# Patient Record
Sex: FEMALE | Race: WHITE | URBAN - METROPOLITAN AREA
[De-identification: names, ages, dates, MRNs, and addresses within clinical notes are randomized per-mention and may not be internally consistent; named-entity substitution may affect disease eponyms.]

---

## 2017-01-03 DIAGNOSIS — J30.1 ALLERGIC RHINITIS DUE TO POLLEN, UNSPECIFIED RHINITIS SEASONALITY: ICD-10-CM

## 2017-01-03 RX ORDER — CETIRIZINE HYDROCHLORIDE 10 MG/1
10 TABLET ORAL DAILY
Qty: 30 TABLET | Refills: 5 | Status: SHIPPED | OUTPATIENT
Start: 2017-01-03 | End: 2017-01-10

## 2018-07-01 PROBLEM — Z00.00 WELL ADULT EXAM: Status: ACTIVE | Noted: 2018-07-01

## 2018-07-02 ENCOUNTER — OFFICE VISIT (OUTPATIENT)
Dept: FAMILY MEDICINE CLINIC | Age: 43
End: 2018-07-02

## 2018-07-02 VITALS
WEIGHT: 156.2 LBS | OXYGEN SATURATION: 100 % | SYSTOLIC BLOOD PRESSURE: 105 MMHG | HEIGHT: 65 IN | HEART RATE: 68 BPM | DIASTOLIC BLOOD PRESSURE: 69 MMHG | TEMPERATURE: 98.5 F | BODY MASS INDEX: 26.02 KG/M2 | RESPIRATION RATE: 12 BRPM

## 2018-07-02 DIAGNOSIS — K64.4 ANAL SKIN TAG: ICD-10-CM

## 2018-07-02 DIAGNOSIS — Z00.00 WELL ADULT EXAM: Primary | ICD-10-CM

## 2018-07-02 PROCEDURE — 99396 PREV VISIT EST AGE 40-64: CPT | Performed by: FAMILY MEDICINE

## 2018-07-02 RX ORDER — CETIRIZINE HYDROCHLORIDE 10 MG/1
TABLET ORAL
Qty: 30 TABLET | Refills: 12 | Status: CANCELLED | OUTPATIENT
Start: 2018-07-02

## 2018-07-02 ASSESSMENT — ENCOUNTER SYMPTOMS
ANAL BLEEDING: 0
BLOOD IN STOOL: 0
CHEST TIGHTNESS: 0
SHORTNESS OF BREATH: 0
CONSTIPATION: 0
VOICE CHANGE: 0
ABDOMINAL PAIN: 0
SORE THROAT: 0
DIARRHEA: 0
RHINORRHEA: 1
CHOKING: 0
NAUSEA: 0
TROUBLE SWALLOWING: 0
WHEEZING: 0

## 2018-07-02 ASSESSMENT — PATIENT HEALTH QUESTIONNAIRE - PHQ9
1. LITTLE INTEREST OR PLEASURE IN DOING THINGS: 0
2. FEELING DOWN, DEPRESSED OR HOPELESS: 0
SUM OF ALL RESPONSES TO PHQ9 QUESTIONS 1 & 2: 0
SUM OF ALL RESPONSES TO PHQ QUESTIONS 1-9: 0

## 2018-07-02 NOTE — PROGRESS NOTES
voice change. Mild allergies. Controlled with over-the-counter allergy meds. Eyes: Negative for visual disturbance. Respiratory: Negative for choking, chest tightness, shortness of breath and wheezing. Cardiovascular: Negative for chest pain, palpitations and leg swelling. Gastrointestinal: Negative for abdominal pain, anal bleeding, blood in stool, constipation, diarrhea and nausea. No GI sxs since had ileitis. Feels occ lump at the rectum that she can push back in. Not sore and no bleeding. She has no constipation or diarrhea. Genitourinary: Negative for dysuria, flank pain, frequency, hematuria, pelvic pain, vaginal bleeding and vaginal discharge. Musculoskeletal: Negative for arthralgias and myalgias. Skin: Negative for rash. Neurological: Negative for weakness, light-headedness and headaches. Hematological: Negative for adenopathy. Does not bruise/bleed easily. Psychiatric/Behavioral: Negative for dysphoric mood and sleep disturbance. The patient is not nervous/anxious.       Lab Results   Component Value Date     08/01/2016    K 4.6 08/01/2016     08/01/2016    CO2 24 08/01/2016    BUN 11 08/01/2016    CREATININE 0.9 08/01/2016    GLUCOSE 93 08/01/2016    CALCIUM 9.8 08/01/2016    PROT 7.4 08/01/2016    LABALBU 4.2 08/01/2016    BILITOT <0.2 08/01/2016    ALKPHOS 61 08/01/2016    AST 14 (L) 08/01/2016    ALT 11 08/01/2016    LABGLOM >60 08/01/2016    GFRAA >60 08/01/2016    AGRATIO 1.3 08/01/2016    GLOB 3.2 08/01/2016      p   Lab Results   Component Value Date    CHOL 212 (H) 06/20/2016    CHOL 208 (H) 05/06/2014    CHOL 191 01/25/2011     Lab Results   Component Value Date    TRIG 124 06/20/2016    TRIG 128 05/06/2014    TRIG 106 01/25/2011     Lab Results   Component Value Date    HDL 58 06/20/2016    HDL 63 (H) 05/06/2014    HDL 51 01/25/2011     Lab Results   Component Value Date    LDLCALC 129 (H) 06/20/2016    LDLCALC 119 (H) 05/06/2014    1811 PassKit 119 01/25/2011     Lab Results   Component Value Date    LABVLDL 25 06/20/2016    LABVLDL 26 05/06/2014     Lab Results   Component Value Date    NEELIMA 3.7 01/25/2011        Objective:   Physical Exam  .  Vitals:    07/02/18 1740   BP: 105/69   Pulse: 68   Resp: 12   Temp: 98.5 °F (36.9 °C)   SpO2: 100%       Physical Exam   Constitutional: She appears well-developed and well-nourished. HENT:   Head: Normocephalic and atraumatic. Right Ear: Hearing, tympanic membrane, external ear and ear canal normal.   Left Ear: Hearing, tympanic membrane, external ear and ear canal normal.   Nose: Nose normal.   Mouth/Throat: Uvula is midline, oropharynx is clear and moist and mucous membranes are normal.   Eyes: Conjunctivae, EOM and lids are normal. Pupils are equal, round, and reactive to light. Fundoscopic exam:       The right eye shows no arteriolar narrowing, no AV nicking and no hemorrhage. The left eye shows no arteriolar narrowing, no AV nicking and no hemorrhage. Neck: Trachea normal. Neck supple. No JVD present. Carotid bruit is not present. No thyroid mass and no thyromegaly present. Cardiovascular: Normal rate, regular rhythm and normal heart sounds. No murmur heard. Pulses:       Carotid pulses are 2+ on the right side, and 2+ on the left side. Femoral pulses are 2+ on the right side, and 2+ on the left side. Dorsalis pedis pulses are 2+ on the right side, and 2+ on the left side. Posterior tibial pulses are 2+ on the right side, and 2+ on the left side. Pulmonary/Chest: Effort normal and breath sounds normal. No respiratory distress. Abdominal: Normal appearance and bowel sounds are normal. There is no hepatosplenomegaly. There is no tenderness. There is no CVA tenderness. No hernia. Small rectal skin tag. No hemorrhoids or mass     Musculoskeletal: Normal range of motion.    Lymphadenopathy:        Head (right side): No submental and no submandibular adenopathy

## 2018-07-25 DIAGNOSIS — J30.1 ALLERGIC RHINITIS DUE TO POLLEN: ICD-10-CM

## 2018-07-25 RX ORDER — CETIRIZINE HYDROCHLORIDE 10 MG/1
TABLET ORAL
Qty: 84 TABLET | Refills: 3 | Status: SHIPPED | OUTPATIENT
Start: 2018-07-25 | End: 2019-06-23 | Stop reason: SDUPTHER

## 2018-07-31 PROBLEM — Z00.00 WELL ADULT EXAM: Status: RESOLVED | Noted: 2018-07-01 | Resolved: 2018-07-31

## 2019-06-23 DIAGNOSIS — J30.1 ALLERGIC RHINITIS DUE TO POLLEN: ICD-10-CM

## 2019-06-24 RX ORDER — CETIRIZINE HYDROCHLORIDE 10 MG/1
TABLET ORAL
Qty: 90 TABLET | Refills: 1 | Status: SHIPPED | OUTPATIENT
Start: 2019-06-24 | End: 2019-07-10

## 2019-07-14 PROBLEM — K64.4 ANAL SKIN TAG: Status: RESOLVED | Noted: 2018-07-02 | Resolved: 2019-07-14

## 2019-07-15 ENCOUNTER — OFFICE VISIT (OUTPATIENT)
Dept: FAMILY MEDICINE CLINIC | Age: 44
End: 2019-07-15
Payer: COMMERCIAL

## 2019-07-15 VITALS
DIASTOLIC BLOOD PRESSURE: 79 MMHG | RESPIRATION RATE: 18 BRPM | TEMPERATURE: 96.3 F | OXYGEN SATURATION: 100 % | HEIGHT: 65 IN | SYSTOLIC BLOOD PRESSURE: 115 MMHG | BODY MASS INDEX: 23.43 KG/M2 | HEART RATE: 67 BPM | WEIGHT: 140.6 LBS

## 2019-07-15 DIAGNOSIS — Z00.00 WELL ADULT EXAM: Primary | ICD-10-CM

## 2019-07-15 DIAGNOSIS — Z23 NEED FOR TDAP VACCINATION: ICD-10-CM

## 2019-07-15 PROCEDURE — 90471 IMMUNIZATION ADMIN: CPT | Performed by: FAMILY MEDICINE

## 2019-07-15 PROCEDURE — 99396 PREV VISIT EST AGE 40-64: CPT | Performed by: FAMILY MEDICINE

## 2019-07-15 PROCEDURE — 90715 TDAP VACCINE 7 YRS/> IM: CPT | Performed by: FAMILY MEDICINE

## 2019-07-15 RX ORDER — CETIRIZINE HYDROCHLORIDE 10 MG/1
TABLET ORAL
Qty: 84 TABLET | Refills: 3 | Status: SHIPPED | OUTPATIENT
Start: 2019-07-15 | End: 2020-07-27

## 2019-07-15 ASSESSMENT — ENCOUNTER SYMPTOMS
ANAL BLEEDING: 0
CHEST TIGHTNESS: 0
CONSTIPATION: 0
ABDOMINAL PAIN: 0
SORE THROAT: 0
DIARRHEA: 0
TROUBLE SWALLOWING: 0
WHEEZING: 0
CHOKING: 0
SHORTNESS OF BREATH: 0
BACK PAIN: 1
VOICE CHANGE: 0
BLOOD IN STOOL: 0
NAUSEA: 0

## 2019-07-15 NOTE — PROGRESS NOTES
tinnitus, trouble swallowing and voice change. Eyes: Negative for visual disturbance. Respiratory: Negative for choking, chest tightness, shortness of breath and wheezing. Cardiovascular: Negative for chest pain, palpitations and leg swelling. Gastrointestinal: Negative for abdominal pain, anal bleeding, blood in stool, constipation, diarrhea and nausea. Genitourinary: Negative for dysuria, flank pain, frequency, hematuria, pelvic pain, vaginal bleeding and vaginal discharge. Musculoskeletal: Positive for back pain. Negative for arthralgias and myalgias. Mild self limiting lower back pain. No radicular pain   Skin: Negative for rash. Neurological: Negative for weakness, light-headedness and headaches. Hematological: Negative for adenopathy. Does not bruise/bleed easily. Psychiatric/Behavioral: Negative for dysphoric mood and sleep disturbance. The patient is not nervous/anxious.       Lab Results   Component Value Date     08/01/2016    K 4.6 08/01/2016     08/01/2016    CO2 24 08/01/2016    BUN 11 08/01/2016    CREATININE 0.9 08/01/2016    GLUCOSE 93 08/01/2016    CALCIUM 9.8 08/01/2016    PROT 7.4 08/01/2016    LABALBU 4.2 08/01/2016    BILITOT <0.2 08/01/2016    ALKPHOS 61 08/01/2016    AST 14 (L) 08/01/2016    ALT 11 08/01/2016    LABGLOM >60 08/01/2016    GFRAA >60 08/01/2016    AGRATIO 1.3 08/01/2016    GLOB 3.2 08/01/2016       Lab Results   Component Value Date    WBC 7.4 08/01/2016    HGB 12.7 08/01/2016    HCT 39.4 08/01/2016    MCV 89.3 08/01/2016     08/01/2016     TSH   Date Value Ref Range Status   06/20/2016 2.78 0.27 - 4.20 uIU/mL Final   05/06/2014 2.44 0.27 - 4.20 uIU/mL Final   02/15/2012 2.44 0.35 - 5.5 uIU/ml Final   01/25/2011 2.73 mIU/L Final     Comment:     See Note 1     Lab Results   Component Value Date    CHOL 212 (H) 06/20/2016    CHOL 208 (H) 05/06/2014    CHOL 191 01/25/2011     Lab Results   Component Value Date    TRIG 124 06/20/2016 Effort normal and breath sounds normal. No respiratory distress. Right breast exhibits no inverted nipple, no mass, no nipple discharge, no skin change and no tenderness. Left breast exhibits no inverted nipple, no mass, no nipple discharge, no skin change and no tenderness. Breasts are symmetrical.   Abdominal: Normal appearance and bowel sounds are normal. There is no hepatosplenomegaly. There is no tenderness. There is no CVA tenderness. No hernia. Musculoskeletal: Normal range of motion. Lymphadenopathy:        Head (right side): No submental and no submandibular adenopathy present. Head (left side): No submental and no submandibular adenopathy present. She has no cervical adenopathy. She has no axillary adenopathy. Right: No inguinal and no supraclavicular adenopathy present. Left: No inguinal and no supraclavicular adenopathy present. Neurological: She is alert. She has normal strength and normal reflexes. Reflex Scores:       Patellar reflexes are 2+ on the right side and 2+ on the left side. Skin: Skin is warm and dry. No bruising, no lesion and no rash noted. Psychiatric: She has a normal mood and affect. Her speech is normal and behavior is normal. Judgment and thought content normal. Cognition and memory are normal.         Assessment and Plan       Diagnosis Orders   1. Need for Tdap vaccination  Tdap (age 6y and older) IM ([address])   2. Well adult exam  Comprehensive Metabolic Panel    CBC    TSH with Reflex    Lipid Panel  Exercise, diet, calcium, Vitamin D addressed. PAP per gyne  Mammogram starting age 36 to 39  DT every 10 years  Influenza vaccine annually      3. Allergic rhinitis due to pollen  cetirizine (ZYRTEC) 10 MG tablet          Follow up in 1 year or prn.

## 2019-07-24 DIAGNOSIS — Z00.00 WELL ADULT EXAM: ICD-10-CM

## 2019-07-24 LAB
A/G RATIO: 1.6 (ref 1.1–2.2)
ALBUMIN SERPL-MCNC: 4.2 G/DL (ref 3.4–5)
ALP BLD-CCNC: 38 U/L (ref 40–129)
ALT SERPL-CCNC: 12 U/L (ref 10–40)
ANION GAP SERPL CALCULATED.3IONS-SCNC: 13 MMOL/L (ref 3–16)
AST SERPL-CCNC: 13 U/L (ref 15–37)
BILIRUB SERPL-MCNC: 0.3 MG/DL (ref 0–1)
BUN BLDV-MCNC: 10 MG/DL (ref 7–20)
CALCIUM SERPL-MCNC: 9.3 MG/DL (ref 8.3–10.6)
CHLORIDE BLD-SCNC: 101 MMOL/L (ref 99–110)
CHOLESTEROL, TOTAL: 201 MG/DL (ref 0–199)
CO2: 23 MMOL/L (ref 21–32)
CREAT SERPL-MCNC: 0.8 MG/DL (ref 0.6–1.1)
GFR AFRICAN AMERICAN: >60
GFR NON-AFRICAN AMERICAN: >60
GLOBULIN: 2.6 G/DL
GLUCOSE BLD-MCNC: 85 MG/DL (ref 70–99)
HCT VFR BLD CALC: 36.4 % (ref 36–48)
HDLC SERPL-MCNC: 58 MG/DL (ref 40–60)
HEMOGLOBIN: 11.8 G/DL (ref 12–16)
LDL CHOLESTEROL CALCULATED: 122 MG/DL
MCH RBC QN AUTO: 30.4 PG (ref 26–34)
MCHC RBC AUTO-ENTMCNC: 32.3 G/DL (ref 31–36)
MCV RBC AUTO: 94.2 FL (ref 80–100)
PDW BLD-RTO: 13.6 % (ref 12.4–15.4)
PLATELET # BLD: 308 K/UL (ref 135–450)
PMV BLD AUTO: 8.2 FL (ref 5–10.5)
POTASSIUM SERPL-SCNC: 4.5 MMOL/L (ref 3.5–5.1)
RBC # BLD: 3.87 M/UL (ref 4–5.2)
SODIUM BLD-SCNC: 137 MMOL/L (ref 136–145)
T4 FREE: 1 NG/DL (ref 0.9–1.8)
TOTAL PROTEIN: 6.8 G/DL (ref 6.4–8.2)
TRIGL SERPL-MCNC: 106 MG/DL (ref 0–150)
TSH REFLEX: 4.72 UIU/ML (ref 0.27–4.2)
VLDLC SERPL CALC-MCNC: 21 MG/DL
WBC # BLD: 6.6 K/UL (ref 4–11)

## 2019-10-31 ENCOUNTER — TELEPHONE (OUTPATIENT)
Dept: FAMILY MEDICINE CLINIC | Age: 44
End: 2019-10-31

## 2019-10-31 DIAGNOSIS — K52.9 ILEITIS: Primary | ICD-10-CM

## 2019-10-31 RX ORDER — METHYLPREDNISOLONE 4 MG/1
TABLET ORAL
Qty: 21 TABLET | Refills: 0 | Status: SHIPPED | OUTPATIENT
Start: 2019-10-31 | End: 2019-11-06

## 2020-03-02 ENCOUNTER — TELEPHONE (OUTPATIENT)
Dept: FAMILY MEDICINE CLINIC | Age: 45
End: 2020-03-02

## 2020-06-17 ENCOUNTER — NURSE TRIAGE (OUTPATIENT)
Dept: OTHER | Facility: CLINIC | Age: 45
End: 2020-06-17

## 2020-06-17 NOTE — TELEPHONE ENCOUNTER
Received call from [facility] in [facility]. Patient calling with the following:    She filed her nail too close to her right index finger on found it swollen, painful, and reddened. She is feeling that her right hand is swollen. She cannot see any wound. The patient is afebrile. Protocol recommendation shared and care advice shared. Call soft transferred to [facility] in [facility] to schedule appointment. Please do not reply to the triage nurse through this encounter. Any subsequent communication should be directly with the patient.     Reason for Disposition   [1] Looks infected (spreading redness, pus) AND [2] no fever    Protocols used: CUTS AND LACERATIONS-ADULT-

## 2020-06-18 ENCOUNTER — VIRTUAL VISIT (OUTPATIENT)
Dept: FAMILY MEDICINE CLINIC | Age: 45
End: 2020-06-18
Payer: COMMERCIAL

## 2020-06-18 PROCEDURE — 99213 OFFICE O/P EST LOW 20 MIN: CPT | Performed by: FAMILY MEDICINE

## 2020-06-18 RX ORDER — DOXYCYCLINE HYCLATE 100 MG
100 TABLET ORAL 2 TIMES DAILY
Qty: 20 TABLET | Refills: 0 | Status: SHIPPED | OUTPATIENT
Start: 2020-06-18 | End: 2020-06-28

## 2020-06-18 RX ORDER — NORETHINDRONE ACETATE AND ETHINYL ESTRADIOL 1.5; 3 MG/1; UG/1
TABLET ORAL
COMMUNITY
Start: 2020-06-13

## 2020-06-18 ASSESSMENT — PATIENT HEALTH QUESTIONNAIRE - PHQ9
SUM OF ALL RESPONSES TO PHQ QUESTIONS 1-9: 0
2. FEELING DOWN, DEPRESSED OR HOPELESS: 0
1. LITTLE INTEREST OR PLEASURE IN DOING THINGS: 0
SUM OF ALL RESPONSES TO PHQ9 QUESTIONS 1 & 2: 0
SUM OF ALL RESPONSES TO PHQ QUESTIONS 1-9: 0

## 2020-06-18 NOTE — PROGRESS NOTES
Laterality Date    LASIK      WISDOM TOOTH EXTRACTION         Health Maintenance   Topic Date Due    Cervical cancer screen  06/01/2022 (Originally 5/1/2019)    Flu vaccine (Season Ended) 09/01/2020    Lipid screen  07/24/2024    DTaP/Tdap/Td vaccine (3 - Td) 07/15/2029    HIV screen  Completed    Hepatitis A vaccine  Aged Out    Hepatitis B vaccine  Aged Out    Hib vaccine  Aged Out    Meningococcal (ACWY) vaccine  Aged Out    Pneumococcal 0-64 years Vaccine  Aged Out       Family History   Problem Relation Age of Onset    Hypertension Father     Coronary Art Dis Maternal Grandmother 52       PHYSICAL EXAMINATION:    Vital Signs: (As obtained by patient/caregiver or practitioner observation)     Heart rate= 76  Respiratory rate= 14 Temperature= 98      Constitutional:  Appears well-developed and well-nourished. No apparent distress                              Mental status:  Alert and awake. Oriented to person/place/time. Able to follow commands       Eyes: EOM intact. Sclera-normal. No erythema of conjunctiva. No eye discharge. HENT: Normocephalic, atraumatic. Mouth/Throat: normal. Mucous membranes are moist.      External Ears: Normal       Neck: No visualized mass      Pulmonary/Chest:  Respiratory effort normal.  No visualized signs of difficulty breathing or respiratory distress         Musculoskeletal:   Normal gait with no signs of ataxia. Normal range of motion of neck. Neurological:         No Facial Asymmetry (Cranial nerve 7 motor function) (limited exam to video visit) . No gaze palsy              Skin:                     No significant exanthematous lesions or discoloration noted on facial skin    Skin: right index finger- tenderness to radial edge with mild edema and erythema. FROM. Psychiatric:          Normal Affect. No Hallucinations           Other pertinent observable physical exam findings:       ASSESSMENT/PLAN:  1.

## 2020-06-29 ENCOUNTER — TELEPHONE (OUTPATIENT)
Dept: FAMILY MEDICINE CLINIC | Age: 45
End: 2020-06-29

## 2020-06-29 ENCOUNTER — NURSE TRIAGE (OUTPATIENT)
Dept: OTHER | Facility: CLINIC | Age: 45
End: 2020-06-29

## 2020-06-29 ENCOUNTER — OFFICE VISIT (OUTPATIENT)
Dept: FAMILY MEDICINE CLINIC | Age: 45
End: 2020-06-29
Payer: COMMERCIAL

## 2020-06-29 VITALS
HEART RATE: 68 BPM | SYSTOLIC BLOOD PRESSURE: 123 MMHG | TEMPERATURE: 98.3 F | DIASTOLIC BLOOD PRESSURE: 85 MMHG | BODY MASS INDEX: 25.92 KG/M2 | RESPIRATION RATE: 12 BRPM | WEIGHT: 154.3 LBS | OXYGEN SATURATION: 98 %

## 2020-06-29 PROCEDURE — 99214 OFFICE O/P EST MOD 30 MIN: CPT | Performed by: FAMILY MEDICINE

## 2020-06-29 NOTE — TELEPHONE ENCOUNTER
Patient was antibiotics for this up until this weekend when she took the last one. Her finger is still swollen. She said that her toe looks a little black in the joint area and they are swollen and a little bit dark in the middle. Right foot on big toe and the toe next to it. and right hand index finger. Patient said she put paroxide on her finger and it is still showing some infection in it. She said that her finger started to get better with the antibiotics but swelling never went away. She is wondering if maybe she needed more antibiotic. No fever.  No apparent injury

## 2020-06-29 NOTE — TELEPHONE ENCOUNTER
Patient called and stated that her finger is still infected and now toes are black. Please give patient a call.

## 2020-07-26 SDOH — HEALTH STABILITY: MENTAL HEALTH: HOW MANY STANDARD DRINKS CONTAINING ALCOHOL DO YOU HAVE ON A TYPICAL DAY?: 1 OR 2

## 2020-07-26 SDOH — HEALTH STABILITY: MENTAL HEALTH: HOW OFTEN DO YOU HAVE A DRINK CONTAINING ALCOHOL?: MONTHLY OR LESS

## 2020-07-26 ASSESSMENT — ENCOUNTER SYMPTOMS
ANAL BLEEDING: 0
CONSTIPATION: 0
ABDOMINAL PAIN: 0
SHORTNESS OF BREATH: 0
VOICE CHANGE: 0
BLOOD IN STOOL: 0
TROUBLE SWALLOWING: 0
WHEEZING: 0
DIARRHEA: 0
CHOKING: 0
CHEST TIGHTNESS: 0
SORE THROAT: 0
NAUSEA: 0

## 2020-07-27 ENCOUNTER — OFFICE VISIT (OUTPATIENT)
Dept: FAMILY MEDICINE CLINIC | Age: 45
End: 2020-07-27
Payer: COMMERCIAL

## 2020-07-27 VITALS
HEIGHT: 66 IN | DIASTOLIC BLOOD PRESSURE: 74 MMHG | BODY MASS INDEX: 25.2 KG/M2 | HEART RATE: 78 BPM | WEIGHT: 156.8 LBS | RESPIRATION RATE: 16 BRPM | SYSTOLIC BLOOD PRESSURE: 110 MMHG | TEMPERATURE: 98.3 F | OXYGEN SATURATION: 98 %

## 2020-07-27 PROCEDURE — 99396 PREV VISIT EST AGE 40-64: CPT | Performed by: FAMILY MEDICINE

## 2020-07-27 RX ORDER — FLUTICASONE PROPIONATE 0.05 MG/G
OINTMENT TOPICAL 2 TIMES DAILY
Qty: 30 G | Refills: 1 | Status: SHIPPED | OUTPATIENT
Start: 2020-07-27

## 2020-07-27 RX ORDER — CETIRIZINE HYDROCHLORIDE 10 MG/1
TABLET ORAL
Qty: 84 TABLET | Refills: 3 | Status: CANCELLED | OUTPATIENT
Start: 2020-07-27

## 2020-07-27 NOTE — PROGRESS NOTES
Subjective:      Patient ID: Alfonso Barber is a 38 y.o. female is here for her annual wellness exam.     HPI    PAP per Dr. Jey Lazo. Was seen 6/21/20. Diet: is working from home. Weight is up since working from home. Lost 42 lbs a few years ago and weight is up 14 lbs. She recently improved diet and is trying to loose again. Exercise:  + 3 to 4 times a week. Health Maintenance   Topic Date Due    Cervical cancer screen  06/01/2022 (Originally 5/1/2019)    Flu vaccine (1) 09/01/2020    Lipid screen  07/24/2024    DTaP/Tdap/Td vaccine (4 - Td) 07/15/2029    HIV screen  Completed    Hepatitis A vaccine  Aged Out    Hepatitis B vaccine  Aged Out    Hib vaccine  Aged Out    Meningococcal (ACWY) vaccine  Aged Out    Pneumococcal 0-64 years Vaccine  Aged Out           Outpatient Medications Marked as Taking for the 7/27/20 encounter (Office Visit) with Paty Ramos MD   Medication Sig Dispense Refill    fluocinonide (LIDEX) 0.05 % cream Apply topically 2 times daily Apply topically 2 times daily to toes. 15 g 0    JUNEL 1.5/30 1.5-30 MG-MCG TABS TAKE 1 TABLET BY MOUTH DAILY      cetirizine (ZYRTEC) 10 MG tablet TAKE 1 TABLET BY MOUTH EVERY DAY 84 tablet 3    Calcium Carb-Cholecalciferol 600-1000 MG-UNIT CAPS Take by mouth      Naproxen Sodium (ALEVE) 220 MG CAPS Take 1 tablet by mouth 2 times daily. Allergies   Allergen Reactions    Latex     Penicillins      + test at allergist       Patient Active Problem List   Diagnosis    Primary insomnia        Past Medical History:   Diagnosis Date    Anal skin tag 7/2/2018    Ileitis 8/1/2016    Iliotibial band syndrome of both sides 5/6/2014       Past Surgical History:   Procedure Laterality Date    LASIK      WISDOM TOOTH EXTRACTION         Social History     Tobacco Use    Smoking status: Never Smoker    Smokeless tobacco: Never Used   Substance Use Topics    Alcohol use:  Yes     Alcohol/week: 0.0 standard drinks AGRATIO 1.6 07/24/2019    GLOB 2.6 07/24/2019        Lab Results   Component Value Date    CHOL 201 (H) 07/24/2019    CHOL 212 (H) 06/20/2016    CHOL 208 (H) 05/06/2014     Lab Results   Component Value Date    TRIG 106 07/24/2019    TRIG 124 06/20/2016    TRIG 128 05/06/2014     Lab Results   Component Value Date    HDL 58 07/24/2019    HDL 58 06/20/2016    HDL 63 (H) 05/06/2014     Lab Results   Component Value Date    LDLCALC 122 (H) 07/24/2019    LDLCALC 129 (H) 06/20/2016    LDLCALC 119 (H) 05/06/2014     Lab Results   Component Value Date    LABVLDL 21 07/24/2019    LABVLDL 25 06/20/2016    LABVLDL 26 05/06/2014     Lab Results   Component Value Date    CHOLHDLRATIO 3.7 01/25/2011     TSH   Date Value Ref Range Status   07/24/2019 4.72 (H) 0.27 - 4.20 uIU/mL Final   06/20/2016 2.78 0.27 - 4.20 uIU/mL Final   05/06/2014 2.44 0.27 - 4.20 uIU/mL Final   02/15/2012 2.44 0.35 - 5.5 uIU/ml Final   01/25/2011 2.73 mIU/L Final     Comment:     See Note 1     Lab Results   Component Value Date    WBC 6.6 07/24/2019    HGB 11.8 (L) 07/24/2019    HCT 36.4 07/24/2019    MCV 94.2 07/24/2019     07/24/2019      Objective:   Physical Exam  .  Vitals:    07/27/20 1541   BP: 110/74   Pulse: 78   Resp: 16   Temp: 98.3 °F (36.8 °C)   SpO2: 98%     Wt Readings from Last 3 Encounters:   07/27/20 156 lb 12.8 oz (71.1 kg)   06/29/20 154 lb 4.8 oz (70 kg)   07/15/19 140 lb 9.6 oz (63.8 kg)         Physical Exam  Constitutional:       Appearance: Normal appearance. She is well-developed. HENT:      Head: Normocephalic and atraumatic. Right Ear: Hearing, tympanic membrane, ear canal and external ear normal.      Left Ear: Hearing, tympanic membrane, ear canal and external ear normal.      Nose: Nose normal.      Mouth/Throat:      Pharynx: Uvula midline. Eyes:      General: Lids are normal.      Conjunctiva/sclera: Conjunctivae normal.      Pupils: Pupils are equal, round, and reactive to light.       Funduscopic exam: Right eye: No hemorrhage, AV nicking or arteriolar narrowing. Left eye: No hemorrhage, AV nicking or arteriolar narrowing. Neck:      Musculoskeletal: Neck supple. Thyroid: No thyroid mass or thyromegaly. Vascular: No carotid bruit or JVD. Trachea: Trachea normal.   Cardiovascular:      Rate and Rhythm: Normal rate and regular rhythm. Pulses:           Carotid pulses are 2+ on the right side and 2+ on the left side. Femoral pulses are 2+ on the right side and 2+ on the left side. Dorsalis pedis pulses are 2+ on the right side and 2+ on the left side. Posterior tibial pulses are 2+ on the right side and 2+ on the left side. Heart sounds: Normal heart sounds. No murmur. Pulmonary:      Effort: Pulmonary effort is normal. No respiratory distress. Breath sounds: Normal breath sounds. Chest:      Breasts: Breasts are symmetrical.         Right: No inverted nipple, mass, nipple discharge, skin change or tenderness. Left: No inverted nipple, mass, nipple discharge, skin change or tenderness. Abdominal:      General: Bowel sounds are normal.      Tenderness: There is no abdominal tenderness. Hernia: No hernia is present. Musculoskeletal: Normal range of motion. Lymphadenopathy:      Head:      Right side of head: No submental or submandibular adenopathy. Left side of head: No submental or submandibular adenopathy. Cervical: No cervical adenopathy. Upper Body:      Right upper body: No supraclavicular adenopathy. Left upper body: No supraclavicular adenopathy. Skin:     General: Skin is warm and dry. Findings: No bruising, lesion or rash. Comments: erythematous maculopapular rash distal toes bilateral.    Neurological:      Mental Status: She is alert. Deep Tendon Reflexes: Reflexes are normal and symmetric.       Reflex Scores:       Patellar reflexes are 2+ on the right side and 2+ on the left side.  Psychiatric:         Speech: Speech normal.         Behavior: Behavior normal.         Thought Content: Thought content normal.         Judgment: Judgment normal.           Assessment and Plan       Diagnosis Orders   1. Well adult exam  Discussed diet, exercise   Calcium and Vitamin D addressed  PAP per gyne  Annual to biannual mammogram  Annual influenza vaccine  Dt every 10 years     2. Subclinical hypothyroidism  Discussed. She prefers to delay testing due to COVID   3. Dyshidrotic eczema  fluticasone (CUTIVATE) 0.005 % ointment  Consider derm referral if not better in 7 to 10 days. Side effects of current medications reviewed and questions answered. Follow up in 1 year or prn.

## 2020-12-26 RX ORDER — AZITHROMYCIN 250 MG/1
250 TABLET, FILM COATED ORAL DAILY
Qty: 6 TABLET | Refills: 0 | Status: SHIPPED | OUTPATIENT
Start: 2020-12-26 | End: 2021-07-27

## 2020-12-31 ENCOUNTER — TELEPHONE (OUTPATIENT)
Dept: FAMILY MEDICINE CLINIC | Age: 45
End: 2020-12-31

## 2020-12-31 NOTE — TELEPHONE ENCOUNTER
----- Message from Shay Diego sent at 12/31/2020  9:46 AM EST -----  Subject: Message to Provider    QUESTIONS  Information for Provider? patient took zpac for strep and finished   yesterday. Still has a white patch in back of throat. Patient wants to   know when she'll no longer be contagious and when can she go back to   normal activities. Please advise.   ---------------------------------------------------------------------------  --------------  CALL BACK INFO  What is the best way for the office to contact you? OK to leave message on   voicemail  Preferred Call Back Phone Number? [phone]  ---------------------------------------------------------------------------  --------------  SCRIPT ANSWERS  Relationship to Patient?  Self

## 2020-12-31 NOTE — TELEPHONE ENCOUNTER
She is not contagious with strep after 24 hours on antibx. If her throat is still sore let me know. Otherwise she can try gargling to clear up the white patch on throat.

## 2021-04-29 ENCOUNTER — TELEPHONE (OUTPATIENT)
Dept: FAMILY MEDICINE CLINIC | Age: 46
End: 2021-04-29

## 2021-08-17 RX ORDER — CETIRIZINE HYDROCHLORIDE 10 MG/1
TABLET ORAL
Qty: 90 TABLET | Refills: 3 | Status: SHIPPED | OUTPATIENT
Start: 2021-08-17 | End: 2021-08-20 | Stop reason: SDUPTHER

## 2021-08-17 RX ORDER — CETIRIZINE HYDROCHLORIDE 10 MG/1
TABLET ORAL
Qty: 90 TABLET | Refills: 3 | Status: CANCELLED | OUTPATIENT
Start: 2021-08-17

## 2021-08-17 NOTE — TELEPHONE ENCOUNTER
Requested Prescriptions     Pending Prescriptions Disp Refills    cetirizine (ZYRTEC) 10 MG tablet [Pharmacy Med Name: CETIRIZINE 10MG TABLETS] 90 tablet 3     Sig: TAKE 1 TABLET BY MOUTH EVERY DAY       LOV 7/27/2020  NOV 8/18/21  Labs 7/24/19

## 2021-08-18 ENCOUNTER — OFFICE VISIT (OUTPATIENT)
Dept: FAMILY MEDICINE CLINIC | Age: 46
End: 2021-08-18
Payer: COMMERCIAL

## 2021-08-18 VITALS
HEIGHT: 65 IN | WEIGHT: 164.8 LBS | TEMPERATURE: 97.6 F | RESPIRATION RATE: 13 BRPM | OXYGEN SATURATION: 99 % | SYSTOLIC BLOOD PRESSURE: 116 MMHG | HEART RATE: 112 BPM | BODY MASS INDEX: 27.46 KG/M2 | DIASTOLIC BLOOD PRESSURE: 70 MMHG

## 2021-08-18 DIAGNOSIS — Z00.00 WELL ADULT EXAM: Primary | ICD-10-CM

## 2021-08-18 DIAGNOSIS — M79.18 MYOFASCIAL MUSCLE PAIN: ICD-10-CM

## 2021-08-18 DIAGNOSIS — Z12.11 COLON CANCER SCREENING: ICD-10-CM

## 2021-08-18 PROCEDURE — 99396 PREV VISIT EST AGE 40-64: CPT | Performed by: FAMILY MEDICINE

## 2021-08-18 ASSESSMENT — ENCOUNTER SYMPTOMS
CONSTIPATION: 0
TROUBLE SWALLOWING: 0
NAUSEA: 0
ANAL BLEEDING: 0
ABDOMINAL PAIN: 0
CHEST TIGHTNESS: 0
SORE THROAT: 0
SHORTNESS OF BREATH: 0
BACK PAIN: 1
BLOOD IN STOOL: 0
DIARRHEA: 0
WHEEZING: 0
CHOKING: 0
VOICE CHANGE: 0

## 2021-08-18 ASSESSMENT — PATIENT HEALTH QUESTIONNAIRE - PHQ9
1. LITTLE INTEREST OR PLEASURE IN DOING THINGS: 0
SUM OF ALL RESPONSES TO PHQ QUESTIONS 1-9: 0
SUM OF ALL RESPONSES TO PHQ9 QUESTIONS 1 & 2: 0
SUM OF ALL RESPONSES TO PHQ QUESTIONS 1-9: 0
SUM OF ALL RESPONSES TO PHQ QUESTIONS 1-9: 0
2. FEELING DOWN, DEPRESSED OR HOPELESS: 0

## 2021-08-18 NOTE — PROGRESS NOTES
Subjective:      Patient ID: Arvin Riojas is a 40 y.o. female is here for her annual wellness exam.     HPI  PAP: [facility]  Dr. James Chávez. appt 7/12/21  Mammogram: never screened. FH negative for breast cancer  Colon cancer screen: due first screening  Vaccines: up-to-date  Exercise: 5 times a week. Diet: restarted [employer] - has lost much but has stopped some of the wt gain. Has been working a lot and gained some weight. Health Maintenance   Topic Date Due    Hepatitis C screen  Never done    Colon cancer screen colonoscopy  08/08/2021    Cervical cancer screen  06/01/2022 (Originally 5/1/2019)    Flu vaccine (1) 09/01/2021    Lipid screen  07/24/2024    DTaP/Tdap/Td vaccine (3 - Td or Tdap) 07/15/2029    COVID-19 Vaccine  Completed    HIV screen  Completed    Hepatitis A vaccine  Aged Out    Hepatitis B vaccine  Aged Out    Hib vaccine  Aged Out    Meningococcal (ACWY) vaccine  Aged Out    Pneumococcal 0-64 years Vaccine  Aged Out           Outpatient Medications Marked as Taking for the 8/18/21 encounter (Office Visit) with Angela Alfredo MD   Medication Sig Dispense Refill    Riboflavin (B2 PO) Take by mouth      cetirizine (ZYRTEC) 10 MG tablet TAKE 1 TABLET BY MOUTH EVERY DAY 90 tablet 3    fluticasone (CUTIVATE) 0.005 % ointment Apply topically 2 times daily Apply topically 2 times daily. 30 g 1    fluocinonide (LIDEX) 0.05 % cream Apply topically 2 times daily Apply topically 2 times daily to toes. 15 g 0    JUNEL 1.5/30 1.5-30 MG-MCG TABS TAKE 1 TABLET BY MOUTH DAILY      Calcium Carb-Cholecalciferol 600-1000 MG-UNIT CAPS Take by mouth      Naproxen Sodium (ALEVE) 220 MG CAPS Take 1 tablet by mouth 2 times daily.          Allergies   Allergen Reactions    Latex     Penicillins      + test at allergist       Patient Active Problem List   Diagnosis    Primary insomnia        Past Medical History:   Diagnosis Date    Anal skin tag 7/2/2018   Neale Councilman 8/1/2016    Iliotibial band syndrome of both sides 5/6/2014       Past Surgical History:   Procedure Laterality Date    LASIK      WISDOM TOOTH EXTRACTION         Social History     Tobacco Use    Smoking status: Never Smoker    Smokeless tobacco: Never Used   Substance Use Topics    Alcohol use: Yes     Alcohol/week: 0.0 standard drinks     Comment: rare    Drug use: No       Family History   Problem Relation Age of Onset    Hypertension Father     Coronary Art Dis Maternal Grandmother 52       Review of Systems  Review of Systems   Constitutional: Negative for activity change, appetite change, fatigue and unexpected weight change. HENT: Negative for congestion, hearing loss, nosebleeds, sore throat, tinnitus, trouble swallowing and voice change. Eyes: Negative for visual disturbance. Respiratory: Negative for choking, chest tightness, shortness of breath and wheezing. Cardiovascular: Negative for chest pain, palpitations and leg swelling. Gastrointestinal: Negative for abdominal pain, anal bleeding, blood in stool, constipation, diarrhea and nausea. Genitourinary: Negative for dysuria, flank pain, frequency, hematuria, pelvic pain, vaginal bleeding and vaginal discharge. Musculoskeletal: Positive for back pain. Negative for arthralgias and myalgias. Chronic lower back pain; manageable with exercise and massage   Skin: Negative for rash. Neurological: Positive for headaches. Negative for weakness and light-headedness. Has menstrual migraines; Dr. Hawa Sin started her B 2. Hematological: Negative for adenopathy. Does not bruise/bleed easily. Psychiatric/Behavioral: Negative for dysphoric mood and sleep disturbance. The patient is not nervous/anxious.       Lab Results   Component Value Date     07/24/2019    K 4.5 07/24/2019     07/24/2019    CO2 23 07/24/2019    BUN 10 07/24/2019    CREATININE 0.8 07/24/2019    GLUCOSE 85 07/24/2019    CALCIUM 9.3 07/24/2019 PROT 6.8 07/24/2019    LABALBU 4.2 07/24/2019    BILITOT 0.3 07/24/2019    ALKPHOS 38 (L) 07/24/2019    AST 13 (L) 07/24/2019    ALT 12 07/24/2019    LABGLOM >60 07/24/2019    GFRAA >60 07/24/2019    AGRATIO 1.6 07/24/2019    GLOB 2.6 07/24/2019        TSH   Date Value Ref Range Status   07/24/2019 4.72 (H) 0.27 - 4.20 uIU/mL Final   06/20/2016 2.78 0.27 - 4.20 uIU/mL Final   05/06/2014 2.44 0.27 - 4.20 uIU/mL Final   02/15/2012 2.44 0.35 - 5.5 uIU/ml Final   01/25/2011 2.73 mIU/L Final     Comment:     See Note 1     Lab Results   Component Value Date    WBC 6.6 07/24/2019    HGB 11.8 (L) 07/24/2019    HCT 36.4 07/24/2019    MCV 94.2 07/24/2019     07/24/2019     Lab Results   Component Value Date    CHOL 201 (H) 07/24/2019    CHOL 212 (H) 06/20/2016    CHOL 208 (H) 05/06/2014     Lab Results   Component Value Date    TRIG 106 07/24/2019    TRIG 124 06/20/2016    TRIG 128 05/06/2014     Lab Results   Component Value Date    HDL 58 07/24/2019    HDL 58 06/20/2016    HDL 63 (H) 05/06/2014     Lab Results   Component Value Date    LDLCALC 122 (H) 07/24/2019    LDLCALC 129 (H) 06/20/2016    LDLCALC 119 (H) 05/06/2014     Lab Results   Component Value Date    LABVLDL 21 07/24/2019    LABVLDL 25 06/20/2016    LABVLDL 26 05/06/2014     Lab Results   Component Value Date    CHOLHDLRATIO 3.7 01/25/2011       Objective:   Physical Exam  .  Vitals:    08/18/21 1630   BP: 116/70   Pulse: 112   Resp: 13   Temp: 97.6 °F (36.4 °C)   SpO2: 99%     Wt Readings from Last 3 Encounters:   08/18/21 164 lb 12.8 oz (74.8 kg)   07/27/20 156 lb 12.8 oz (71.1 kg)   06/29/20 154 lb 4.8 oz (70 kg)        Physical Exam  Constitutional:       Appearance: Normal appearance. She is well-developed. HENT:      Head: Normocephalic and atraumatic.       Right Ear: Hearing, tympanic membrane, ear canal and external ear normal.      Left Ear: Hearing, tympanic membrane, ear canal and external ear normal.      Nose: Nose normal.   Eyes: General: Lids are normal.      Conjunctiva/sclera: Conjunctivae normal.   Neck:      Thyroid: No thyroid mass or thyromegaly. Trachea: Trachea normal.   Cardiovascular:      Rate and Rhythm: Normal rate and regular rhythm. Pulses: Normal pulses. Heart sounds: Normal heart sounds. No murmur heard. Pulmonary:      Effort: Pulmonary effort is normal.      Breath sounds: Normal breath sounds. Abdominal:      General: Bowel sounds are normal.      Palpations: Abdomen is soft. Tenderness: There is no abdominal tenderness. Hernia: No hernia is present. Musculoskeletal:      Cervical back: Neck supple. Lymphadenopathy:      Head:      Right side of head: No submandibular adenopathy. Left side of head: No submandibular adenopathy. Cervical: No cervical adenopathy. Skin:     General: Skin is warm and dry. Findings: No lesion or rash. Comments: No abnormal appearing lesions on exposed skin   Neurological:      Mental Status: She is alert and oriented to person, place, and time. Gait: Gait normal.      Deep Tendon Reflexes:      Reflex Scores:       Patellar reflexes are 2+ on the right side and 2+ on the left side. Psychiatric:         Speech: Speech normal.         Behavior: Behavior normal.         Judgment: Judgment normal.           Assessment and Plan       Diagnosis Orders   1. Well adult exam  Comprehensive Metabolic Panel    TSH with Reflex    Lipid Panel    CBC    Hepatitis C Antibody  Exercise, diet, calcium, Vitamin D addressed. PAP per gyne  Mammogram addressed; she prefers to wait til age 49  DT every 10 years  Influenza vaccine annually      2. Colon cancer screening  [facility] - Jenny Mills MD, Gastroenterology, [facility]   3. Myofascial muscle pain  External Referral To Massage Therapy          Follow up in 1 year or prn.

## 2021-08-20 RX ORDER — CETIRIZINE HYDROCHLORIDE 10 MG/1
TABLET ORAL
Qty: 90 TABLET | Refills: 3 | Status: SHIPPED | OUTPATIENT
Start: 2021-08-20 | End: 2022-08-22

## 2021-08-20 NOTE — TELEPHONE ENCOUNTER
PT called stating she spoke with her pharmacy and they do not have the medication. States they informed her they need a refill sent in:    cetirizine (ZYRTEC) 10 MG tablet [[id number]]     Order Details  Dose, Route, Frequency: As Directed   Dispense Quantity: 90 tablet Refills: 3          Sig: TAKE 1 TABLET BY MOUTH EVERY DAY         Start Date: 08/17/21 End Date: --   Written Date: 08/17/21 Expiration Date: 08/17/22   Original Order:  cetirizine (ZYRTEC) 10 MG tablet [[id number]]     Pharmacy    [place] [address] [facility] [address] - P [phone] - F [phone]      Please advise, thank you!

## 2021-08-20 NOTE — TELEPHONE ENCOUNTER
Requested Prescriptions     Pending Prescriptions Disp Refills    cetirizine (ZYRTEC) 10 MG tablet 90 tablet 3     Sig: TAKE 1 TABLET BY MOUTH EVERY DAY       LOV 8/18/2021  No f/u  Labs 7/24/19

## 2022-08-17 NOTE — PROGRESS NOTES
Subjective:      Patient ID: Lucy Viramontes is a 56 y.o. female is here for her annual wellness exam.     HPI    PAP + HPV negative 5/22/19. Sees gyne  Breast cancer: never screened. FH negative for breast cancer  Colon cancer screen:  done 7/11/22. Small polyp removed. 5 year follow up recommended. Vaccines: up-to-date   Diet:  not eating well with not sleeping well. Exercise:    Lipids: elevated last year compared to previous. The 10-year ASCVD risk score (Barry Holman et al., 2013) is: 1%    Values used to calculate the score:      Age: 56 years      Sex: Female      Is Non- : No      Diabetic: No      Tobacco smoker: No      Systolic Blood Pressure: 043 mmHg      Is BP treated: No      HDL Cholesterol: 51 mg/dL      Total Cholesterol: 232 mg/dL     Health Maintenance   Topic Date Due    Cervical cancer screen  Never done    COVID-19 Vaccine (3 - Booster for Pfizer series) 09/21/2021    Depression Screen  08/18/2022    Flu vaccine (1) 09/01/2022    Lipids  08/23/2026    Colorectal Cancer Screen  07/08/2027    DTaP/Tdap/Td vaccine (3 - Td or Tdap) 07/15/2029    Hepatitis C screen  Completed    HIV screen  Completed    Hepatitis A vaccine  Aged Out    Hepatitis B vaccine  Aged Out    Hib vaccine  Aged Out    Meningococcal (ACWY) vaccine  Aged Out    Pneumococcal 0-64 years Vaccine  Aged Out           Outpatient Medications Marked as Taking for the 8/22/22 encounter (Office Visit) with Jae Kimball MD   Medication Sig Dispense Refill    cetirizine (ZYRTEC) 10 MG tablet TAKE 1 TABLET BY MOUTH EVERY DAY 90 tablet 3    Riboflavin (B2 PO) Take by mouth      fluticasone (CUTIVATE) 0.005 % ointment Apply topically 2 times daily Apply topically 2 times daily. 30 g 1    JUNEL 1.5/30 1.5-30 MG-MCG TABS TAKE 1 TABLET BY MOUTH DAILY      Calcium Carb-Cholecalciferol 600-1000 MG-UNIT CAPS Take by mouth      Naproxen Sodium 220 MG CAPS Take 1 tablet by mouth 2 times daily. Allergies   Allergen Reactions    Latex     Penicillins      + test at allergist       Patient Active Problem List   Diagnosis    Primary insomnia        Past Medical History:   Diagnosis Date    Anal skin tag 7/2/2018    Ileitis 8/1/2016    Iliotibial band syndrome of both sides 5/6/2014       Past Surgical History:   Procedure Laterality Date    LASIK      WISDOM TOOTH EXTRACTION         Social History     Tobacco Use    Smoking status: Never    Smokeless tobacco: Never   Substance Use Topics    Alcohol use: Yes     Alcohol/week: 0.0 standard drinks     Comment: rare    Drug use: No       Family History   Problem Relation Age of Onset    Hypertension Father     Coronary Art Dis Maternal Grandmother 52       Review of Systems  Review of Systems   Constitutional:  Negative for activity change, appetite change, fatigue and unexpected weight change. HENT:  Positive for hearing loss. Negative for congestion, nosebleeds, sore throat, tinnitus, trouble swallowing and voice change. Mild hearing loss x 6 mos. 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 No tinnitus. Eyes:  Negative for visual disturbance. Respiratory:  Negative for cough, choking, chest tightness, shortness of breath and wheezing. Cardiovascular:  Negative for chest pain, palpitations and leg swelling. Gastrointestinal:  Negative for abdominal pain, anal bleeding, blood in stool, constipation, diarrhea and nausea. Genitourinary:  Negative for dysuria, flank pain, frequency, hematuria, pelvic pain, urgency, vaginal bleeding and vaginal discharge. Musculoskeletal:  Positive for back pain. Negative for arthralgias and myalgias. Chronic back pain resolved with stretching and doing planks. Skin:  Negative for color change and rash. Allergic/Immunologic: Negative for environmental allergies. Neurological:  Negative for weakness, light-headedness and headaches.    Hematological:  Does not bruise/bleed easily. Psychiatric/Behavioral:  Positive for dysphoric mood. Negative for sleep disturbance. The patient is not nervous/anxious. Depressed for about a year. Thought she would be  and have kids by this time in her life. Works a lot of hours. She has trouble falling to sleep and trouble maintaining sleep on and off. Does not have a routine bedtime. Does meditation every day. Has helped her migraines but not her sleep. Wants to eat more when tired. Does not think she needs medication.     Lab Results   Component Value Date     08/23/2021    K 4.2 08/23/2021    CL 99 08/23/2021    CO2 21 08/23/2021    BUN 9 08/23/2021    CREATININE 1.0 08/23/2021    GLUCOSE 88 08/23/2021    CALCIUM 9.5 08/23/2021    PROT 7.1 08/23/2021    LABALBU 4.1 08/23/2021    BILITOT <0.2 08/23/2021    ALKPHOS 50 08/23/2021    AST 18 08/23/2021    ALT 11 08/23/2021    LABGLOM 60 (A) 08/23/2021    GFRAA >60 08/23/2021    AGRATIO 1.4 08/23/2021    GLOB 3.0 08/23/2021        Lab Results   Component Value Date    WBC 5.7 08/23/2021    HGB 12.2 08/23/2021    HCT 36.3 08/23/2021    MCV 90.5 08/23/2021     08/23/2021     TSH   Date Value Ref Range Status   08/23/2021 4.60 (H) 0.27 - 4.20 uIU/mL Final   07/24/2019 4.72 (H) 0.27 - 4.20 uIU/mL Final   06/20/2016 2.78 0.27 - 4.20 uIU/mL Final   02/15/2012 2.44 0.35 - 5.5 uIU/ml Final   01/25/2011 2.73 mIU/L Final     Comment:     See Note 1     Lab Results   Component Value Date    CHOL 232 (H) 08/23/2021    CHOL 201 (H) 07/24/2019    CHOL 212 (H) 06/20/2016     Lab Results   Component Value Date    TRIG 145 08/23/2021    TRIG 106 07/24/2019    TRIG 124 06/20/2016     Lab Results   Component Value Date    HDL 51 08/23/2021    HDL 58 07/24/2019    HDL 58 06/20/2016     Lab Results   Component Value Date    LDLCALC 152 (H) 08/23/2021    LDLCALC 122 (H) 07/24/2019    LDLCALC 129 (H) 06/20/2016     Lab Results   Component Value Date    LABVLDL 29 08/23/2021 LABVLDL 21 07/24/2019    LABVLDL 25 06/20/2016     Lab Results   Component Value Date    NEELIMA 3.7 01/25/2011      Objective:   Physical Exam  .  Vitals:    08/22/22 1644   BP: 116/78   Pulse: 77   Resp: 14   Temp: 97.9 °F (36.6 °C)   SpO2: 99%     Wt Readings from Last 3 Encounters:   08/22/22 180 lb (81.6 kg)   08/18/21 164 lb 12.8 oz (74.8 kg)   07/27/20 156 lb 12.8 oz (71.1 kg)        Physical Exam  Constitutional:       Appearance: Normal appearance. She is well-developed. HENT:      Head: Normocephalic and atraumatic. Right Ear: Hearing, tympanic membrane, ear canal and external ear normal.      Left Ear: Hearing, tympanic membrane, ear canal and external ear normal.      Nose: Nose normal.   Eyes:      General: Lids are normal.      Conjunctiva/sclera: Conjunctivae normal.   Neck:      Thyroid: No thyroid mass or thyromegaly. Trachea: Trachea normal.   Cardiovascular:      Rate and Rhythm: Normal rate and regular rhythm. Pulses: Normal pulses. Heart sounds: Normal heart sounds. No murmur heard. Pulmonary:      Effort: Pulmonary effort is normal.      Breath sounds: Normal breath sounds. Abdominal:      General: Bowel sounds are normal.      Palpations: Abdomen is soft. Tenderness: There is no abdominal tenderness. Hernia: No hernia is present. Musculoskeletal:      Cervical back: Neck supple. Lymphadenopathy:      Head:      Right side of head: No submandibular adenopathy. Left side of head: No submandibular adenopathy. Cervical: No cervical adenopathy. Skin:     General: Skin is warm and dry. Findings: No lesion or rash. Comments: No abnormal appearing lesions on exposed skin   Neurological:      Mental Status: She is alert and oriented to person, place, and time. Gait: Gait normal.      Deep Tendon Reflexes:      Reflex Scores:       Patellar reflexes are 2+ on the right side and 2+ on the left side.   Psychiatric:         Speech: Speech normal.         Behavior: Behavior normal.         Judgment: Judgment normal.         Assessment and Plan       Diagnosis Orders   1. Well adult exam  CBC    Comprehensive Metabolic Panel    TSH with Reflex    Lipid Panel      2. Primary insomnia  Sleep hygiene, CBT addressed. Consider Trazodone       3. Reactive depression  Addressed; consider counseling. Continue meditation. Work on sleep, exercise. Follow up in 1 year or prn.

## 2022-08-22 ENCOUNTER — OFFICE VISIT (OUTPATIENT)
Dept: FAMILY MEDICINE CLINIC | Age: 47
End: 2022-08-22
Payer: COMMERCIAL

## 2022-08-22 VITALS
RESPIRATION RATE: 14 BRPM | DIASTOLIC BLOOD PRESSURE: 78 MMHG | HEIGHT: 65 IN | TEMPERATURE: 97.9 F | BODY MASS INDEX: 29.99 KG/M2 | HEART RATE: 77 BPM | WEIGHT: 180 LBS | OXYGEN SATURATION: 99 % | SYSTOLIC BLOOD PRESSURE: 116 MMHG

## 2022-08-22 DIAGNOSIS — Z00.00 WELL ADULT EXAM: Primary | ICD-10-CM

## 2022-08-22 DIAGNOSIS — F32.9 REACTIVE DEPRESSION: ICD-10-CM

## 2022-08-22 DIAGNOSIS — F51.01 PRIMARY INSOMNIA: ICD-10-CM

## 2022-08-22 PROCEDURE — 99396 PREV VISIT EST AGE 40-64: CPT | Performed by: FAMILY MEDICINE

## 2022-08-22 RX ORDER — CETIRIZINE HYDROCHLORIDE 10 MG/1
TABLET ORAL
Qty: 90 TABLET | Refills: 3 | Status: SHIPPED | OUTPATIENT
Start: 2022-08-22

## 2022-08-22 SDOH — ECONOMIC STABILITY: FOOD INSECURITY: WITHIN THE PAST 12 MONTHS, YOU WORRIED THAT YOUR FOOD WOULD RUN OUT BEFORE YOU GOT MONEY TO BUY MORE.: NEVER TRUE

## 2022-08-22 SDOH — ECONOMIC STABILITY: FOOD INSECURITY: WITHIN THE PAST 12 MONTHS, THE FOOD YOU BOUGHT JUST DIDN'T LAST AND YOU DIDN'T HAVE MONEY TO GET MORE.: NEVER TRUE

## 2022-08-22 ASSESSMENT — ENCOUNTER SYMPTOMS
CHEST TIGHTNESS: 0
COUGH: 0
CHOKING: 0
WHEEZING: 0
NAUSEA: 0
CONSTIPATION: 0
VOICE CHANGE: 0
DIARRHEA: 0
SHORTNESS OF BREATH: 0
COLOR CHANGE: 0
BACK PAIN: 1
SORE THROAT: 0
TROUBLE SWALLOWING: 0
BLOOD IN STOOL: 0
ANAL BLEEDING: 0
ABDOMINAL PAIN: 0

## 2022-08-22 ASSESSMENT — PATIENT HEALTH QUESTIONNAIRE - PHQ9
SUM OF ALL RESPONSES TO PHQ QUESTIONS 1-9: 0
SUM OF ALL RESPONSES TO PHQ QUESTIONS 1-9: 0
1. LITTLE INTEREST OR PLEASURE IN DOING THINGS: 0
SUM OF ALL RESPONSES TO PHQ9 QUESTIONS 1 & 2: 0
SUM OF ALL RESPONSES TO PHQ QUESTIONS 1-9: 0
2. FEELING DOWN, DEPRESSED OR HOPELESS: 0
SUM OF ALL RESPONSES TO PHQ QUESTIONS 1-9: 0

## 2022-08-22 ASSESSMENT — SOCIAL DETERMINANTS OF HEALTH (SDOH): HOW HARD IS IT FOR YOU TO PAY FOR THE VERY BASICS LIKE FOOD, HOUSING, MEDICAL CARE, AND HEATING?: NOT HARD AT ALL

## 2022-08-22 NOTE — TELEPHONE ENCOUNTER
08/18/2022 LOV  08/22/2022 - Scheduled Appointment  Labs: 08/17/2022 - Pending to be Completed    Requested Prescriptions     Pending Prescriptions Disp Refills    cetirizine (ZYRTEC) 10 MG tablet [Pharmacy Med Name: CETIRIZINE 10MG TABLETS] 90 tablet 3     Sig: TAKE 1 TABLET BY MOUTH EVERY DAY

## 2022-09-23 DIAGNOSIS — Z00.00 WELL ADULT EXAM: ICD-10-CM

## 2022-09-24 LAB
A/G RATIO: 1.6 (ref 1.1–2.2)
ALBUMIN SERPL-MCNC: 4.5 G/DL (ref 3.4–5)
ALP BLD-CCNC: 64 U/L (ref 40–129)
ALT SERPL-CCNC: 14 U/L (ref 10–40)
ANION GAP SERPL CALCULATED.3IONS-SCNC: 15 MMOL/L (ref 3–16)
AST SERPL-CCNC: 19 U/L (ref 15–37)
BILIRUB SERPL-MCNC: 0.3 MG/DL (ref 0–1)
BUN BLDV-MCNC: 6 MG/DL (ref 7–20)
CALCIUM SERPL-MCNC: 9.7 MG/DL (ref 8.3–10.6)
CHLORIDE BLD-SCNC: 103 MMOL/L (ref 99–110)
CHOLESTEROL, TOTAL: 256 MG/DL (ref 0–199)
CO2: 23 MMOL/L (ref 21–32)
CREAT SERPL-MCNC: 0.8 MG/DL (ref 0.6–1.1)
GFR AFRICAN AMERICAN: >60
GFR NON-AFRICAN AMERICAN: >60
GLUCOSE BLD-MCNC: 88 MG/DL (ref 70–99)
HCT VFR BLD CALC: 36.8 % (ref 36–48)
HDLC SERPL-MCNC: 64 MG/DL (ref 40–60)
HEMOGLOBIN: 12.4 G/DL (ref 12–16)
LDL CHOLESTEROL CALCULATED: 171 MG/DL
MCH RBC QN AUTO: 29.8 PG (ref 26–34)
MCHC RBC AUTO-ENTMCNC: 33.7 G/DL (ref 31–36)
MCV RBC AUTO: 88.3 FL (ref 80–100)
PDW BLD-RTO: 13.6 % (ref 12.4–15.4)
PLATELET # BLD: 318 K/UL (ref 135–450)
PMV BLD AUTO: 7.8 FL (ref 5–10.5)
POTASSIUM SERPL-SCNC: 4 MMOL/L (ref 3.5–5.1)
RBC # BLD: 4.17 M/UL (ref 4–5.2)
SODIUM BLD-SCNC: 141 MMOL/L (ref 136–145)
TOTAL PROTEIN: 7.4 G/DL (ref 6.4–8.2)
TRIGL SERPL-MCNC: 104 MG/DL (ref 0–150)
TSH REFLEX: 2.26 UIU/ML (ref 0.27–4.2)
VLDLC SERPL CALC-MCNC: 21 MG/DL
WBC # BLD: 5 K/UL (ref 4–11)

## 2023-04-25 ENCOUNTER — OFFICE VISIT (OUTPATIENT)
Dept: FAMILY MEDICINE CLINIC | Age: 48
End: 2023-04-25
Payer: COMMERCIAL

## 2023-04-25 VITALS
DIASTOLIC BLOOD PRESSURE: 80 MMHG | HEART RATE: 85 BPM | OXYGEN SATURATION: 98 % | SYSTOLIC BLOOD PRESSURE: 110 MMHG | HEIGHT: 65 IN | TEMPERATURE: 98.3 F | BODY MASS INDEX: 30.82 KG/M2 | WEIGHT: 185 LBS

## 2023-04-25 DIAGNOSIS — J30.9 ALLERGIC RHINITIS, UNSPECIFIED SEASONALITY, UNSPECIFIED TRIGGER: Primary | ICD-10-CM

## 2023-04-25 PROCEDURE — 99213 OFFICE O/P EST LOW 20 MIN: CPT | Performed by: NURSE PRACTITIONER

## 2023-04-25 RX ORDER — CETIRIZINE HYDROCHLORIDE 10 MG/1
10 TABLET ORAL DAILY
Qty: 90 TABLET | Refills: 3 | Status: SHIPPED | OUTPATIENT
Start: 2023-04-25

## 2023-04-25 SDOH — ECONOMIC STABILITY: INCOME INSECURITY: HOW HARD IS IT FOR YOU TO PAY FOR THE VERY BASICS LIKE FOOD, HOUSING, MEDICAL CARE, AND HEATING?: NOT HARD AT ALL

## 2023-04-25 SDOH — ECONOMIC STABILITY: HOUSING INSECURITY
IN THE LAST 12 MONTHS, WAS THERE A TIME WHEN YOU DID NOT HAVE A STEADY PLACE TO SLEEP OR SLEPT IN A SHELTER (INCLUDING NOW)?: NO

## 2023-04-25 SDOH — ECONOMIC STABILITY: FOOD INSECURITY: WITHIN THE PAST 12 MONTHS, THE FOOD YOU BOUGHT JUST DIDN'T LAST AND YOU DIDN'T HAVE MONEY TO GET MORE.: NEVER TRUE

## 2023-04-25 SDOH — ECONOMIC STABILITY: FOOD INSECURITY: WITHIN THE PAST 12 MONTHS, YOU WORRIED THAT YOUR FOOD WOULD RUN OUT BEFORE YOU GOT MONEY TO BUY MORE.: NEVER TRUE

## 2023-04-25 ASSESSMENT — ENCOUNTER SYMPTOMS
CHEST TIGHTNESS: 0
COUGH: 0
EYE REDNESS: 0
SINUS PAIN: 0
WHEEZING: 0
DIARRHEA: 0
CONSTIPATION: 0
EYE PAIN: 0
SHORTNESS OF BREATH: 0
SINUS PRESSURE: 0
BACK PAIN: 0
TROUBLE SWALLOWING: 0
ABDOMINAL PAIN: 0
VOMITING: 0
STRIDOR: 0
ABDOMINAL DISTENTION: 0
EYE ITCHING: 0
NAUSEA: 0
RHINORRHEA: 0
EYE DISCHARGE: 0

## 2023-04-25 ASSESSMENT — PATIENT HEALTH QUESTIONNAIRE - PHQ9
9. THOUGHTS THAT YOU WOULD BE BETTER OFF DEAD, OR OF HURTING YOURSELF: 0
1. LITTLE INTEREST OR PLEASURE IN DOING THINGS: 0
3. TROUBLE FALLING OR STAYING ASLEEP: 0
4. FEELING TIRED OR HAVING LITTLE ENERGY: 0
7. TROUBLE CONCENTRATING ON THINGS, SUCH AS READING THE NEWSPAPER OR WATCHING TELEVISION: 0
SUM OF ALL RESPONSES TO PHQ QUESTIONS 1-9: 0
DEPRESSION UNABLE TO ASSESS: FUNCTIONAL CAPACITY MOTIVATION LIMITS ACCURACY
8. MOVING OR SPEAKING SO SLOWLY THAT OTHER PEOPLE COULD HAVE NOTICED. OR THE OPPOSITE, BEING SO FIGETY OR RESTLESS THAT YOU HAVE BEEN MOVING AROUND A LOT MORE THAN USUAL: 0
6. FEELING BAD ABOUT YOURSELF - OR THAT YOU ARE A FAILURE OR HAVE LET YOURSELF OR YOUR FAMILY DOWN: 0
5. POOR APPETITE OR OVEREATING: 0
SUM OF ALL RESPONSES TO PHQ9 QUESTIONS 1 & 2: 0
SUM OF ALL RESPONSES TO PHQ QUESTIONS 1-9: 0
2. FEELING DOWN, DEPRESSED OR HOPELESS: 0
10. IF YOU CHECKED OFF ANY PROBLEMS, HOW DIFFICULT HAVE THESE PROBLEMS MADE IT FOR YOU TO DO YOUR WORK, TAKE CARE OF THINGS AT HOME, OR GET ALONG WITH OTHER PEOPLE: 0

## 2023-04-25 NOTE — PROGRESS NOTES
Yasmine Spencer (:  1975) is a 56 y.o. female,Established patient, here for evaluation of the following chief complaint(s):  New Patient      ASSESSMENT/PLAN:  1. Allergic rhinitis, unspecified seasonality, unspecified trigger  Assessment & Plan:   Stable cont zyrtec        No follow-ups on file. SUBJECTIVE/OBJECTIVE:  Needs refill on Zyrtec  Feels fine  Not due for physical yet  Had covid a month ago. Took Ivermectin and abx for presumed strep. Much better. Current Outpatient Medications   Medication Sig Dispense Refill    cetirizine (ZYRTEC) 10 MG tablet Take 1 tablet by mouth daily 90 tablet 3    Riboflavin (B2 PO) Take by mouth      JUNEL 1.5/30 1.5-30 MG-MCG TABS TAKE 1 TABLET BY MOUTH DAILY      Calcium Carb-Cholecalciferol 600-1000 MG-UNIT CAPS Take by mouth       No current facility-administered medications for this visit. Review of Systems   Constitutional:  Negative for chills, fatigue and fever. HENT:  Negative for congestion, ear pain, hearing loss, rhinorrhea, sinus pressure, sinus pain, tinnitus and trouble swallowing. Eyes:  Negative for pain, discharge, redness and itching. Respiratory:  Negative for cough, chest tightness, shortness of breath, wheezing and stridor. Cardiovascular:  Negative for chest pain, palpitations and leg swelling. Gastrointestinal:  Negative for abdominal distention, abdominal pain, constipation, diarrhea, nausea and vomiting. Genitourinary:  Negative for difficulty urinating, dysuria, hematuria and urgency. Musculoskeletal:  Negative for back pain, joint swelling, myalgias and neck pain. Skin:  Negative for rash and wound. Neurological:  Negative for dizziness and headaches.      Vitals:    23 1327   BP: 110/80   Site: Left Upper Arm   Position: Sitting   Cuff Size: Medium Adult   Pulse: 85   Temp: 98.3 °F (36.8 °C)   SpO2: 98%   Weight: 185 lb (83.9 kg)   Height: 5' 5\" (1.651 m)       Physical Exam            An electronic

## 2023-08-31 ENCOUNTER — OFFICE VISIT (OUTPATIENT)
Dept: FAMILY MEDICINE CLINIC | Age: 48
End: 2023-08-31
Payer: COMMERCIAL

## 2023-08-31 VITALS
BODY MASS INDEX: 30.45 KG/M2 | TEMPERATURE: 96.9 F | SYSTOLIC BLOOD PRESSURE: 110 MMHG | HEART RATE: 55 BPM | DIASTOLIC BLOOD PRESSURE: 82 MMHG | WEIGHT: 183 LBS | OXYGEN SATURATION: 96 %

## 2023-08-31 DIAGNOSIS — Z00.00 ENCOUNTER FOR PHYSICAL EXAMINATION: ICD-10-CM

## 2023-08-31 DIAGNOSIS — J30.9 ALLERGIC RHINITIS, UNSPECIFIED SEASONALITY, UNSPECIFIED TRIGGER: ICD-10-CM

## 2023-08-31 DIAGNOSIS — R22.2 MASS OF BUTTOCK: ICD-10-CM

## 2023-08-31 DIAGNOSIS — Z00.00 ENCOUNTER FOR PHYSICAL EXAMINATION: Primary | ICD-10-CM

## 2023-08-31 PROCEDURE — 99396 PREV VISIT EST AGE 40-64: CPT | Performed by: NURSE PRACTITIONER

## 2023-08-31 RX ORDER — CETIRIZINE HYDROCHLORIDE 10 MG/1
10 TABLET ORAL DAILY
Qty: 90 TABLET | Refills: 4 | Status: SHIPPED | OUTPATIENT
Start: 2023-08-31

## 2023-08-31 ASSESSMENT — ENCOUNTER SYMPTOMS
VOICE CHANGE: 0
TROUBLE SWALLOWING: 0
CONSTIPATION: 0
SHORTNESS OF BREATH: 0
COUGH: 0
COLOR CHANGE: 0
WHEEZING: 0
DIARRHEA: 0

## 2023-08-31 NOTE — ASSESSMENT & PLAN NOTE
Suspect mass is a hematoma from when she fell 6-8 months ago. Patient denies pain except when she recently had a deep tissue massage and she noticed tenderness in the area. Patient refuses imaging of mass today.

## 2023-08-31 NOTE — ASSESSMENT & PLAN NOTE
Physical exam preformed today. Diet and exercise regimen reviewed. Patient to work on limiting her sweets in her diet. Labs today.

## 2023-08-31 NOTE — PROGRESS NOTES
questions answered. Pt voiced understanding. Medications reviewed and patient understands. Questions answered  Patient engaged in shared decision making. Information given to evaluate options of treatment, understand what is needed and discuss importance of following plan.

## 2023-09-01 LAB
ALBUMIN SERPL-MCNC: 4.3 G/DL (ref 3.4–5)
ALBUMIN/GLOB SERPL: 1.3 {RATIO} (ref 1.1–2.2)
ALP SERPL-CCNC: 55 U/L (ref 40–129)
ALT SERPL-CCNC: 18 U/L (ref 10–40)
ANION GAP SERPL CALCULATED.3IONS-SCNC: 10 MMOL/L (ref 3–16)
AST SERPL-CCNC: 21 U/L (ref 15–37)
BASOPHILS # BLD: 0 K/UL (ref 0–0.2)
BASOPHILS NFR BLD: 0.5 %
BILIRUB SERPL-MCNC: <0.2 MG/DL (ref 0–1)
BUN SERPL-MCNC: 12 MG/DL (ref 7–20)
CALCIUM SERPL-MCNC: 9.7 MG/DL (ref 8.3–10.6)
CHLORIDE SERPL-SCNC: 104 MMOL/L (ref 99–110)
CHOLEST SERPL-MCNC: 231 MG/DL (ref 0–199)
CO2 SERPL-SCNC: 24 MMOL/L (ref 21–32)
CREAT SERPL-MCNC: 0.9 MG/DL (ref 0.6–1.1)
DEPRECATED RDW RBC AUTO: 13.7 % (ref 12.4–15.4)
EOSINOPHIL # BLD: 0 K/UL (ref 0–0.6)
EOSINOPHIL NFR BLD: 0.6 %
GFR SERPLBLD CREATININE-BSD FMLA CKD-EPI: >60 ML/MIN/{1.73_M2}
GLUCOSE SERPL-MCNC: 85 MG/DL (ref 70–99)
HCT VFR BLD AUTO: 36.5 % (ref 36–48)
HDLC SERPL-MCNC: 66 MG/DL (ref 40–60)
HGB BLD-MCNC: 12.2 G/DL (ref 12–16)
LDLC SERPL CALC-MCNC: 146 MG/DL
LYMPHOCYTES # BLD: 2 K/UL (ref 1–5.1)
LYMPHOCYTES NFR BLD: 31.6 %
MCH RBC QN AUTO: 29.7 PG (ref 26–34)
MCHC RBC AUTO-ENTMCNC: 33.4 G/DL (ref 31–36)
MCV RBC AUTO: 88.8 FL (ref 80–100)
MONOCYTES # BLD: 0.3 K/UL (ref 0–1.3)
MONOCYTES NFR BLD: 5 %
NEUTROPHILS # BLD: 3.9 K/UL (ref 1.7–7.7)
NEUTROPHILS NFR BLD: 62.3 %
PLATELET # BLD AUTO: 287 K/UL (ref 135–450)
PMV BLD AUTO: 8.3 FL (ref 5–10.5)
POTASSIUM SERPL-SCNC: 4.7 MMOL/L (ref 3.5–5.1)
PROT SERPL-MCNC: 7.7 G/DL (ref 6.4–8.2)
RBC # BLD AUTO: 4.11 M/UL (ref 4–5.2)
SODIUM SERPL-SCNC: 138 MMOL/L (ref 136–145)
TRIGL SERPL-MCNC: 97 MG/DL (ref 0–150)
TSH SERPL DL<=0.005 MIU/L-ACNC: 2.26 UIU/ML (ref 0.27–4.2)
VLDLC SERPL CALC-MCNC: 19 MG/DL
WBC # BLD AUTO: 6.3 K/UL (ref 4–11)

## 2024-09-15 DIAGNOSIS — J30.9 ALLERGIC RHINITIS, UNSPECIFIED SEASONALITY, UNSPECIFIED TRIGGER: ICD-10-CM

## 2024-09-16 RX ORDER — CETIRIZINE HYDROCHLORIDE 10 MG/1
10 TABLET ORAL DAILY
Qty: 90 TABLET | Refills: 4 | Status: SHIPPED | OUTPATIENT
Start: 2024-09-16

## 2024-09-30 ENCOUNTER — OFFICE VISIT (OUTPATIENT)
Dept: FAMILY MEDICINE CLINIC | Age: 49
End: 2024-09-30
Payer: COMMERCIAL

## 2024-09-30 VITALS
DIASTOLIC BLOOD PRESSURE: 70 MMHG | WEIGHT: 187 LBS | SYSTOLIC BLOOD PRESSURE: 132 MMHG | HEART RATE: 101 BPM | HEIGHT: 65 IN | BODY MASS INDEX: 31.16 KG/M2 | OXYGEN SATURATION: 98 % | TEMPERATURE: 96.9 F

## 2024-09-30 DIAGNOSIS — Z00.00 ENCOUNTER FOR WELL ADULT EXAM WITHOUT ABNORMAL FINDINGS: ICD-10-CM

## 2024-09-30 DIAGNOSIS — Z12.31 ENCOUNTER FOR SCREENING MAMMOGRAM FOR BREAST CANCER: ICD-10-CM

## 2024-09-30 DIAGNOSIS — Z00.00 ENCOUNTER FOR WELL ADULT EXAM WITHOUT ABNORMAL FINDINGS: Primary | ICD-10-CM

## 2024-09-30 PROCEDURE — 99396 PREV VISIT EST AGE 40-64: CPT | Performed by: NURSE PRACTITIONER

## 2024-09-30 SDOH — ECONOMIC STABILITY: FOOD INSECURITY: WITHIN THE PAST 12 MONTHS, THE FOOD YOU BOUGHT JUST DIDN'T LAST AND YOU DIDN'T HAVE MONEY TO GET MORE.: NEVER TRUE

## 2024-09-30 SDOH — ECONOMIC STABILITY: FOOD INSECURITY: WITHIN THE PAST 12 MONTHS, YOU WORRIED THAT YOUR FOOD WOULD RUN OUT BEFORE YOU GOT MONEY TO BUY MORE.: NEVER TRUE

## 2024-09-30 SDOH — ECONOMIC STABILITY: INCOME INSECURITY: HOW HARD IS IT FOR YOU TO PAY FOR THE VERY BASICS LIKE FOOD, HOUSING, MEDICAL CARE, AND HEATING?: NOT HARD AT ALL

## 2024-09-30 ASSESSMENT — PATIENT HEALTH QUESTIONNAIRE - PHQ9
SUM OF ALL RESPONSES TO PHQ QUESTIONS 1-9: 0
1. LITTLE INTEREST OR PLEASURE IN DOING THINGS: NOT AT ALL

## 2024-09-30 NOTE — PATIENT INSTRUCTIONS
Sdoh not applicable        Learning About Breast Cancer Screening  Breast cancer happens when cells that are not normal grow in one or both of your breasts. Screening tests can help find some cancers that are too small to feel or before they cause symptoms. Breast cancer may be easier to treat if it's found early.    Ask your doctor about your risk for breast cancer. You can also go to www.cancer.gov/bcrisktool to find out.    Age is one factor that affects your risk for breast cancer. The risk goes up as you get older.  How is breast cancer screening done?    Mammogram. This is the most common test used to screen for breast cancer. It uses X-rays. Two types are a digital mammogram (DM) and a 3D mammogram.    Clinical breast exam. Talk to your doctor about whether to have this exam. Your doctor checks your breasts and under your arms for lumps or other changes.    MRI (magnetic resonance imaging) of the breast. An MRI may be used if you have a high risk of breast cancer. You may have a standard MRI or a “fast” MRI.  When should you get screened?    Experts don’t agree on when to start screening and how often to screen. Decide this with your doctor. Also decide with your doctor when to stop screening.    If you are at average risk, you can start screening sometime between ages 40 and 50. Have a mammogram every 1 or 2 years.  What are the risks of screening?  Breast cancer screening has some possible risks. Your doctor can help you compare the benefits to the risks.    Screening may lead to more tests. After a mammogram, some people need more tests to make sure they don’t have breast cancer. This is more common in younger people. Most of the time, the results of these extra tests are normal.    Screening may find cancer that won’t hurt you. Doctors can't always tell which cancer will be life-threatening. You could have cancer treatment that you may not need.    Sometimes screening tests miss cancer that is there. This

## 2024-10-01 LAB
ALBUMIN SERPL-MCNC: 4.2 G/DL (ref 3.4–5)
ALBUMIN/GLOB SERPL: 1.6 {RATIO} (ref 1.1–2.2)
ALP SERPL-CCNC: 61 U/L (ref 40–129)
ALT SERPL-CCNC: 13 U/L (ref 10–40)
ANION GAP SERPL CALCULATED.3IONS-SCNC: 12 MMOL/L (ref 3–16)
AST SERPL-CCNC: 18 U/L (ref 15–37)
BASOPHILS # BLD: 0 K/UL (ref 0–0.2)
BASOPHILS NFR BLD: 0.5 %
BILIRUB SERPL-MCNC: <0.2 MG/DL (ref 0–1)
BUN SERPL-MCNC: 7 MG/DL (ref 7–20)
CALCIUM SERPL-MCNC: 9.4 MG/DL (ref 8.3–10.6)
CHLORIDE SERPL-SCNC: 100 MMOL/L (ref 99–110)
CHOLEST SERPL-MCNC: 242 MG/DL (ref 0–199)
CO2 SERPL-SCNC: 24 MMOL/L (ref 21–32)
CREAT SERPL-MCNC: 0.8 MG/DL (ref 0.6–1.1)
DEPRECATED RDW RBC AUTO: 13.6 % (ref 12.4–15.4)
EOSINOPHIL # BLD: 0 K/UL (ref 0–0.6)
EOSINOPHIL NFR BLD: 0.6 %
GFR SERPLBLD CREATININE-BSD FMLA CKD-EPI: >90 ML/MIN/{1.73_M2}
GLUCOSE SERPL-MCNC: 89 MG/DL (ref 70–99)
HCT VFR BLD AUTO: 36.3 % (ref 36–48)
HDLC SERPL-MCNC: 59 MG/DL (ref 40–60)
HGB BLD-MCNC: 12.2 G/DL (ref 12–16)
LDLC SERPL CALC-MCNC: 154 MG/DL
LYMPHOCYTES # BLD: 1.8 K/UL (ref 1–5.1)
LYMPHOCYTES NFR BLD: 30 %
MCH RBC QN AUTO: 29.7 PG (ref 26–34)
MCHC RBC AUTO-ENTMCNC: 33.8 G/DL (ref 31–36)
MCV RBC AUTO: 88 FL (ref 80–100)
MONOCYTES # BLD: 0.3 K/UL (ref 0–1.3)
MONOCYTES NFR BLD: 4.1 %
NEUTROPHILS # BLD: 3.9 K/UL (ref 1.7–7.7)
NEUTROPHILS NFR BLD: 64.8 %
PLATELET # BLD AUTO: 312 K/UL (ref 135–450)
PMV BLD AUTO: 8 FL (ref 5–10.5)
POTASSIUM SERPL-SCNC: 4.2 MMOL/L (ref 3.5–5.1)
PROT SERPL-MCNC: 6.9 G/DL (ref 6.4–8.2)
RBC # BLD AUTO: 4.12 M/UL (ref 4–5.2)
SODIUM SERPL-SCNC: 136 MMOL/L (ref 136–145)
TRIGL SERPL-MCNC: 143 MG/DL (ref 0–150)
VLDLC SERPL CALC-MCNC: 29 MG/DL
WBC # BLD AUTO: 6.1 K/UL (ref 4–11)

## 2025-04-01 ENCOUNTER — OFFICE VISIT (OUTPATIENT)
Dept: FAMILY MEDICINE CLINIC | Age: 50
End: 2025-04-01
Payer: COMMERCIAL

## 2025-04-01 VITALS
DIASTOLIC BLOOD PRESSURE: 82 MMHG | TEMPERATURE: 98.3 F | BODY MASS INDEX: 31.28 KG/M2 | OXYGEN SATURATION: 98 % | SYSTOLIC BLOOD PRESSURE: 132 MMHG | HEART RATE: 88 BPM | WEIGHT: 188 LBS

## 2025-04-01 DIAGNOSIS — J06.9 VIRAL URI: Primary | ICD-10-CM

## 2025-04-01 DIAGNOSIS — Z00.00 HEALTHCARE MAINTENANCE: ICD-10-CM

## 2025-04-01 PROCEDURE — 99214 OFFICE O/P EST MOD 30 MIN: CPT | Performed by: NURSE PRACTITIONER

## 2025-04-01 PROCEDURE — G2211 COMPLEX E/M VISIT ADD ON: HCPCS | Performed by: NURSE PRACTITIONER

## 2025-04-01 RX ORDER — BROMPHENIRAMINE MALEATE, PSEUDOEPHEDRINE HYDROCHLORIDE, AND DEXTROMETHORPHAN HYDROBROMIDE 2; 30; 10 MG/5ML; MG/5ML; MG/5ML
5 SYRUP ORAL 4 TIMES DAILY PRN
Qty: 118 ML | Refills: 1 | Status: SHIPPED | OUTPATIENT
Start: 2025-04-01

## 2025-04-01 RX ORDER — DOXYCYCLINE HYCLATE 100 MG
100 TABLET ORAL 2 TIMES DAILY
Qty: 14 TABLET | Refills: 0 | Status: SHIPPED | OUTPATIENT
Start: 2025-04-01 | End: 2025-04-08

## 2025-04-01 SDOH — ECONOMIC STABILITY: FOOD INSECURITY: WITHIN THE PAST 12 MONTHS, THE FOOD YOU BOUGHT JUST DIDN'T LAST AND YOU DIDN'T HAVE MONEY TO GET MORE.: NEVER TRUE

## 2025-04-01 SDOH — ECONOMIC STABILITY: FOOD INSECURITY: WITHIN THE PAST 12 MONTHS, YOU WORRIED THAT YOUR FOOD WOULD RUN OUT BEFORE YOU GOT MONEY TO BUY MORE.: NEVER TRUE

## 2025-04-01 ASSESSMENT — PATIENT HEALTH QUESTIONNAIRE - PHQ9
2. FEELING DOWN, DEPRESSED OR HOPELESS: NOT AT ALL
10. IF YOU CHECKED OFF ANY PROBLEMS, HOW DIFFICULT HAVE THESE PROBLEMS MADE IT FOR YOU TO DO YOUR WORK, TAKE CARE OF THINGS AT HOME, OR GET ALONG WITH OTHER PEOPLE: NOT DIFFICULT AT ALL
SUM OF ALL RESPONSES TO PHQ QUESTIONS 1-9: 0
1. LITTLE INTEREST OR PLEASURE IN DOING THINGS: NOT AT ALL
8. MOVING OR SPEAKING SO SLOWLY THAT OTHER PEOPLE COULD HAVE NOTICED. OR THE OPPOSITE, BEING SO FIGETY OR RESTLESS THAT YOU HAVE BEEN MOVING AROUND A LOT MORE THAN USUAL: NOT AT ALL
6. FEELING BAD ABOUT YOURSELF - OR THAT YOU ARE A FAILURE OR HAVE LET YOURSELF OR YOUR FAMILY DOWN: NOT AT ALL
5. POOR APPETITE OR OVEREATING: NOT AT ALL
9. THOUGHTS THAT YOU WOULD BE BETTER OFF DEAD, OR OF HURTING YOURSELF: NOT AT ALL
SUM OF ALL RESPONSES TO PHQ QUESTIONS 1-9: 0
SUM OF ALL RESPONSES TO PHQ QUESTIONS 1-9: 0
4. FEELING TIRED OR HAVING LITTLE ENERGY: NOT AT ALL
SUM OF ALL RESPONSES TO PHQ QUESTIONS 1-9: 0
7. TROUBLE CONCENTRATING ON THINGS, SUCH AS READING THE NEWSPAPER OR WATCHING TELEVISION: NOT AT ALL
3. TROUBLE FALLING OR STAYING ASLEEP: NOT AT ALL

## 2025-04-01 ASSESSMENT — ANXIETY QUESTIONNAIRES
6. BECOMING EASILY ANNOYED OR IRRITABLE: NOT AT ALL
3. WORRYING TOO MUCH ABOUT DIFFERENT THINGS: NOT AT ALL
IF YOU CHECKED OFF ANY PROBLEMS ON THIS QUESTIONNAIRE, HOW DIFFICULT HAVE THESE PROBLEMS MADE IT FOR YOU TO DO YOUR WORK, TAKE CARE OF THINGS AT HOME, OR GET ALONG WITH OTHER PEOPLE: NOT DIFFICULT AT ALL
1. FEELING NERVOUS, ANXIOUS, OR ON EDGE: NOT AT ALL
7. FEELING AFRAID AS IF SOMETHING AWFUL MIGHT HAPPEN: NOT AT ALL
GAD7 TOTAL SCORE: 0
2. NOT BEING ABLE TO STOP OR CONTROL WORRYING: NOT AT ALL
4. TROUBLE RELAXING: NOT AT ALL
5. BEING SO RESTLESS THAT IT IS HARD TO SIT STILL: NOT AT ALL

## 2025-04-01 NOTE — PROGRESS NOTES
Kimberlyn Felix (:  1975) is a 49 y.o. female,Established patient, here for evaluation of the following chief complaint(s):  Cold Symptoms (Pt complaints of cold symptoms for 7 day has a cough sore throat fatigue symptoms getting worse)      ASSESSMENT/PLAN:  Assessment & Plan  1. Common cold/sinusitis- ew acute.  - Symptoms include sore throat, congestion, runny nose, unproductive cough, fatigue, and headache persisting for 7 days. The sore throat has slightly improved, but the cough has worsened.  - Physical exam findings indicate postnasal drip and no signs of strep throat.  - A prescription for a cough syrup containing dextromethorphan, Sudafed, and brompheniramine will be provided to manage the cough and postnasal drip. She is advised to take this medication at night.  - If there is no significant improvement by Friday, she should commence the antibiotic treatment. Over-the-counter decongestants such as Sudafed and cough drops can be used during the day. She is also encouraged to maintain high fluid intake and perform Neti sinus rinses.    2. Health Maintenance.  - She is advised to schedule an appointment before losing her insurance coverage at the end of .  - Review of records indicates she is due for a Pap smear in September.  - Counseling provided regarding the importance of maintaining health and scheduling necessary appointments before insurance coverage ends.  - A 12-month supply of her current medications will be refilled.      1. Viral URI  -     brompheniramine-pseudoephedrine-DM (BROMFED DM) 2-30-10 MG/5ML syrup; Take 5 mLs by mouth 4 times daily as needed for Congestion or Cough, Disp-118 mL, R-1Normal  2. Healthcare maintenance    No follow-ups on file.    SUBJECTIVE/OBJECTIVE:    History of Present Illness  The patient is a 49-year-old  female who presents for evaluation of a cold.    Symptoms consistent with a common cold began 7 days ago. These symptoms include a sore

## 2025-06-19 ENCOUNTER — OFFICE VISIT (OUTPATIENT)
Dept: FAMILY MEDICINE CLINIC | Age: 50
End: 2025-06-19
Payer: COMMERCIAL

## 2025-06-19 VITALS
DIASTOLIC BLOOD PRESSURE: 68 MMHG | SYSTOLIC BLOOD PRESSURE: 110 MMHG | HEART RATE: 78 BPM | TEMPERATURE: 97.5 F | OXYGEN SATURATION: 91 %

## 2025-06-19 DIAGNOSIS — J30.9 ALLERGIC RHINITIS, UNSPECIFIED SEASONALITY, UNSPECIFIED TRIGGER: ICD-10-CM

## 2025-06-19 DIAGNOSIS — Z00.00 ENCOUNTER FOR PHYSICAL EXAMINATION: Primary | ICD-10-CM

## 2025-06-19 DIAGNOSIS — Z00.00 ENCOUNTER FOR WELL ADULT EXAM WITHOUT ABNORMAL FINDINGS: ICD-10-CM

## 2025-06-19 DIAGNOSIS — Z00.00 ENCOUNTER FOR PHYSICAL EXAMINATION: ICD-10-CM

## 2025-06-19 DIAGNOSIS — H53.10 ACCOMMODATIVE EYE STRAIN: ICD-10-CM

## 2025-06-19 PROBLEM — R22.2 MASS OF BUTTOCK: Status: RESOLVED | Noted: 2023-08-31 | Resolved: 2025-06-19

## 2025-06-19 PROCEDURE — 99396 PREV VISIT EST AGE 40-64: CPT | Performed by: NURSE PRACTITIONER

## 2025-06-19 RX ORDER — CETIRIZINE HYDROCHLORIDE 10 MG/1
10 TABLET ORAL DAILY
Qty: 90 TABLET | Refills: 4 | Status: SHIPPED | OUTPATIENT
Start: 2025-06-19

## 2025-06-19 NOTE — PROGRESS NOTES
Well Adult Note  Name: Kimberlyn Felix Today’s Date: 2025   MRN: [id number] Sex: Female   Age: 49 y.o. Ethnicity: Non- / Non    : 1975 Race: White (non-)      Kimberlyn Felix is here for a well adult exam.          Assessment & Plan  1. Annual visit.  - Blood pressure readings are within the normal range at 110/68.  - Advised to undergo a mammogram before her insurance lapses.  - Prescription refill for cetirizine has been provided and sent to pharmacy.  - Current medications include Zyrtec (cetirizine) and Altagracia (from OB/GYN).    2. Blurred vision.  - Reports occasional blurry vision at the end of the day, likely due to prolonged computer use.  - Advised to rest eyes periodically throughout the day and ensure mid-range vision for the computer screen is appropriate.  - Recommended to get an eye exam to check for any underlying issues such as glaucoma or cataracts.  - Advised to consider glasses with blue light blockers.    3. Spider veins.  - Presence of spider veins on legs likely due to age-related vascular pressures.  - Advised to wear compression stockings during the day, especially when upright for extended periods or walking frequently.  - Discussed that exercise helps but compression stockings are more effective for preventing blood from collecting in the legs.  - Explained that spider veins are due to vascular pressures and wearing compression stockings can help manage them.    5. Hematoma.  - Hematoma on buttock has resolved, leaving only a small scar noticeable during massages.  - Noted that the hematoma has decreased in size.  - No further treatment required at this time.  - Will continue to monitor for any changes.  Encounter for physical examination  -     CBC with Auto Differential; Future  -     Comprehensive Metabolic Panel; Future  -     LIPID PANEL; Future  -     TSH reflex to FT4,FT3 ([facility] Only); Future  Encounter for well adult exam without

## 2025-06-19 NOTE — PATIENT INSTRUCTIONS
Well Visit, Ages 18 to 65: Care Instructions  Well visits can help you stay healthy. Your doctor has checked your overall health and may have suggested ways to take good care of yourself. Your doctor also may have recommended tests. You can help prevent illness with healthy eating, good sleep, vaccinations, regular exercise, and other steps.    Get the tests that you and your doctor decide on. Depending on your age and risks, examples might include screening for diabetes; hepatitis C; HIV; and cervical, breast, lung, and colon cancer. Screening helps find diseases before any symptoms appear.   Eat healthy foods. Choose fruits, vegetables, whole grains, lean protein, and low-fat dairy foods. Limit saturated fat and reduce salt.     Limit alcohol. Men should have no more than 2 drinks a day. Women should have no more than 1. For some people, no alcohol is the best choice.   Exercise. Get at least 30 minutes of exercise on most days of the week. Walking can be a good choice.     Reach and stay at your healthy weight. This will lower your risk for many health problems.   Take care of your mental health. Try to stay connected with friends, family, and community, and find ways to manage stress.     If you're feeling depressed or hopeless, talk to someone. A counselor can help. If you don't have a counselor, talk to your doctor.   Talk to your doctor if you think you may have a problem with alcohol or drug use. This includes prescription medicines, marijuana, and other drugs.     Avoid tobacco and nicotine: Don't smoke, vape, or chew. If you need help quitting, talk to your doctor.   Practice safer sex. Getting tested, using condoms or dental dams, and limiting sex partners can help prevent STIs.     Use birth control if it's important to you to prevent pregnancy. Talk with your doctor about your choices and what might be best for you.   Prevent problems where you can. Protect your skin from too much sun, wash your  hide hide

## 2025-06-20 LAB
ALBUMIN SERPL-MCNC: 4.3 G/DL (ref 3.4–5)
ALBUMIN/GLOB SERPL: 1.5 {RATIO} (ref 1.1–2.2)
ALP SERPL-CCNC: 55 U/L (ref 40–129)
ALT SERPL-CCNC: 14 U/L (ref 10–40)
ANION GAP SERPL CALCULATED.3IONS-SCNC: 10 MMOL/L (ref 3–16)
AST SERPL-CCNC: 17 U/L (ref 15–37)
BASOPHILS # BLD: 0 K/UL (ref 0–0.2)
BASOPHILS NFR BLD: 0.6 %
BILIRUB SERPL-MCNC: 0.3 MG/DL (ref 0–1)
BUN SERPL-MCNC: 10 MG/DL (ref 7–20)
CALCIUM SERPL-MCNC: 9.4 MG/DL (ref 8.3–10.6)
CHLORIDE SERPL-SCNC: 101 MMOL/L (ref 99–110)
CHOLEST SERPL-MCNC: 256 MG/DL (ref 0–199)
CO2 SERPL-SCNC: 24 MMOL/L (ref 21–32)
CREAT SERPL-MCNC: 0.8 MG/DL (ref 0.6–1.1)
DEPRECATED RDW RBC AUTO: 14.3 % (ref 12.4–15.4)
EOSINOPHIL # BLD: 0 K/UL (ref 0–0.6)
EOSINOPHIL NFR BLD: 0.5 %
GFR SERPLBLD CREATININE-BSD FMLA CKD-EPI: >90 ML/MIN/{1.73_M2}
GLUCOSE SERPL-MCNC: 92 MG/DL (ref 70–99)
HCT VFR BLD AUTO: 35.9 % (ref 36–48)
HDLC SERPL-MCNC: 55 MG/DL (ref 40–60)
HGB BLD-MCNC: 12.2 G/DL (ref 12–16)
LDLC SERPL CALC-MCNC: 168 MG/DL
LYMPHOCYTES # BLD: 1.8 K/UL (ref 1–5.1)
LYMPHOCYTES NFR BLD: 26.1 %
MCH RBC QN AUTO: 29.6 PG (ref 26–34)
MCHC RBC AUTO-ENTMCNC: 34 G/DL (ref 31–36)
MCV RBC AUTO: 87 FL (ref 80–100)
MONOCYTES # BLD: 0.3 K/UL (ref 0–1.3)
MONOCYTES NFR BLD: 4.2 %
NEUTROPHILS # BLD: 4.8 K/UL (ref 1.7–7.7)
NEUTROPHILS NFR BLD: 68.6 %
PLATELET # BLD AUTO: 323 K/UL (ref 135–450)
PMV BLD AUTO: 8.2 FL (ref 5–10.5)
POTASSIUM SERPL-SCNC: 4.6 MMOL/L (ref 3.5–5.1)
PROT SERPL-MCNC: 7.2 G/DL (ref 6.4–8.2)
RBC # BLD AUTO: 4.13 M/UL (ref 4–5.2)
SODIUM SERPL-SCNC: 135 MMOL/L (ref 136–145)
TRIGL SERPL-MCNC: 165 MG/DL (ref 0–150)
TSH SERPL DL<=0.005 MIU/L-ACNC: 2.95 UIU/ML (ref 0.27–4.2)
VLDLC SERPL CALC-MCNC: 33 MG/DL
WBC # BLD AUTO: 6.9 K/UL (ref 4–11)

## 2025-06-24 ENCOUNTER — RESULTS FOLLOW-UP (OUTPATIENT)
Dept: FAMILY MEDICINE CLINIC | Age: 50
End: 2025-06-24